# Patient Record
Sex: MALE | Race: WHITE
[De-identification: names, ages, dates, MRNs, and addresses within clinical notes are randomized per-mention and may not be internally consistent; named-entity substitution may affect disease eponyms.]

---

## 2019-06-27 ENCOUNTER — HOSPITAL ENCOUNTER (INPATIENT)
Dept: HOSPITAL 54 - ER | Age: 69
LOS: 2 days | Discharge: ANOTHER HOSPITAL | DRG: 641 | End: 2019-06-29
Attending: NURSE PRACTITIONER | Admitting: INTERNAL MEDICINE
Payer: COMMERCIAL

## 2019-06-27 VITALS — DIASTOLIC BLOOD PRESSURE: 57 MMHG | SYSTOLIC BLOOD PRESSURE: 123 MMHG

## 2019-06-27 VITALS — WEIGHT: 176 LBS | HEIGHT: 73 IN | BODY MASS INDEX: 23.33 KG/M2

## 2019-06-27 VITALS — SYSTOLIC BLOOD PRESSURE: 123 MMHG | DIASTOLIC BLOOD PRESSURE: 57 MMHG

## 2019-06-27 DIAGNOSIS — M47.816: ICD-10-CM

## 2019-06-27 DIAGNOSIS — D69.6: ICD-10-CM

## 2019-06-27 DIAGNOSIS — D64.9: ICD-10-CM
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
## 2019-06-27 DIAGNOSIS — M43.16: ICD-10-CM

## 2019-06-27 DIAGNOSIS — M48.56XA: ICD-10-CM
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
## 2019-06-27 DIAGNOSIS — E83.42: ICD-10-CM

## 2019-06-27 DIAGNOSIS — M85.80: ICD-10-CM

## 2019-06-27 DIAGNOSIS — Z86.718: ICD-10-CM

## 2019-06-27 DIAGNOSIS — F12.90: ICD-10-CM

## 2019-06-27 DIAGNOSIS — D35.02: ICD-10-CM

## 2019-06-27 DIAGNOSIS — E88.09: ICD-10-CM

## 2019-06-27 DIAGNOSIS — E86.0: Primary | ICD-10-CM

## 2019-06-27 LAB
BASOPHILS # BLD AUTO: 0 /CMM (ref 0–0.2)
BASOPHILS NFR BLD AUTO: 0.3 % (ref 0–2)
BUN SERPL-MCNC: 15 MG/DL (ref 7–18)
CALCIUM SERPL-MCNC: 7.6 MG/DL (ref 8.5–10.1)
CHLORIDE SERPL-SCNC: 106 MMOL/L (ref 98–107)
CO2 SERPL-SCNC: 23 MMOL/L (ref 21–32)
CREAT SERPL-MCNC: 1.2 MG/DL (ref 0.6–1.3)
EOSINOPHIL NFR BLD AUTO: 0.2 % (ref 0–6)
GLUCOSE SERPL-MCNC: 133 MG/DL (ref 74–106)
HCT VFR BLD AUTO: 40 % (ref 39–51)
HGB BLD-MCNC: 13.4 G/DL (ref 13.5–17.5)
LYMPHOCYTES NFR BLD AUTO: 0.7 /CMM (ref 0.8–4.8)
LYMPHOCYTES NFR BLD AUTO: 8.3 % (ref 20–44)
MCHC RBC AUTO-ENTMCNC: 34 G/DL (ref 31–36)
MCV RBC AUTO: 98 FL (ref 80–96)
MONOCYTES NFR BLD AUTO: 0.7 /CMM (ref 0.1–1.3)
MONOCYTES NFR BLD AUTO: 7.5 % (ref 2–12)
NEUTROPHILS # BLD AUTO: 7.3 /CMM (ref 1.8–8.9)
NEUTROPHILS NFR BLD AUTO: 83.7 % (ref 43–81)
PLATELET # BLD AUTO: 145 /CMM (ref 150–450)
POTASSIUM SERPL-SCNC: 3.9 MMOL/L (ref 3.5–5.1)
RBC # BLD AUTO: 4.07 MIL/UL (ref 4.5–6)
SODIUM SERPL-SCNC: 138 MMOL/L (ref 136–145)
WBC NRBC COR # BLD AUTO: 8.8 K/UL (ref 4.3–11)

## 2019-06-27 PROCEDURE — G0378 HOSPITAL OBSERVATION PER HR: HCPCS

## 2019-06-27 RX ADMIN — ENOXAPARIN SODIUM SCH MG: 40 INJECTION SUBCUTANEOUS at 23:11

## 2019-06-27 NOTE — NUR
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 
PAIN,STS, HE MAY HAVE LANDED ON HIS BACK, PT IS AAOX4, NOT IN RESPIRATORY 
DISTRESS, HOOKED TO MONITOR, KEPT RESTED AND COMFORTABLE, WILL CONTINUE TO 
MONITOR.

## 2019-06-27 NOTE — NUR
mri screening form.



attempted to perform mri screening form with patient. patient states , "I would rather do 
that tomorrow am when i have a more sound mind. i just want to rest right now." will follow 
up.

## 2019-06-27 NOTE — NUR
admission note rn



patient admitted from er .patient being admitted for syncope telemetry to dr. jackson rn 
312 bed 1. patient in no apparent distress. tele applied patient is in sr. pt also had 
lumbar fracture per xray report. patient reports having back pain when he moves 6/10 but 
states tolerable when he lays still. reviewed pain managment plan with patient. bed dlw  
locked patient in no apparent distress. verbalized understanding to call for assistance and 
verbalized understanding of how to use call light. will cont to monitor.

## 2019-06-28 VITALS — DIASTOLIC BLOOD PRESSURE: 68 MMHG | SYSTOLIC BLOOD PRESSURE: 134 MMHG

## 2019-06-28 VITALS — SYSTOLIC BLOOD PRESSURE: 119 MMHG | DIASTOLIC BLOOD PRESSURE: 60 MMHG

## 2019-06-28 VITALS — SYSTOLIC BLOOD PRESSURE: 120 MMHG | DIASTOLIC BLOOD PRESSURE: 65 MMHG

## 2019-06-28 VITALS — DIASTOLIC BLOOD PRESSURE: 70 MMHG | SYSTOLIC BLOOD PRESSURE: 130 MMHG

## 2019-06-28 VITALS — SYSTOLIC BLOOD PRESSURE: 117 MMHG | DIASTOLIC BLOOD PRESSURE: 73 MMHG

## 2019-06-28 VITALS — SYSTOLIC BLOOD PRESSURE: 125 MMHG | DIASTOLIC BLOOD PRESSURE: 60 MMHG

## 2019-06-28 LAB
ALBUMIN SERPL BCP-MCNC: 2.9 G/DL (ref 3.4–5)
ALP SERPL-CCNC: 61 U/L (ref 46–116)
ALT SERPL W P-5'-P-CCNC: 19 U/L (ref 12–78)
AST SERPL W P-5'-P-CCNC: 25 U/L (ref 15–37)
BASOPHILS # BLD AUTO: 0 /CMM (ref 0–0.2)
BASOPHILS NFR BLD AUTO: 0.1 % (ref 0–2)
BILIRUB SERPL-MCNC: 1 MG/DL (ref 0.2–1)
BUN SERPL-MCNC: 11 MG/DL (ref 7–18)
CALCIUM SERPL-MCNC: 7.8 MG/DL (ref 8.5–10.1)
CHLORIDE SERPL-SCNC: 106 MMOL/L (ref 98–107)
CHOLEST SERPL-MCNC: 119 MG/DL (ref ?–200)
CO2 SERPL-SCNC: 25 MMOL/L (ref 21–32)
CREAT SERPL-MCNC: 1.1 MG/DL (ref 0.6–1.3)
EOSINOPHIL NFR BLD AUTO: 0 % (ref 0–6)
GLUCOSE SERPL-MCNC: 101 MG/DL (ref 74–106)
HCT VFR BLD AUTO: 37 % (ref 39–51)
HDLC SERPL-MCNC: 46 MG/DL (ref 40–60)
HGB BLD-MCNC: 12.6 G/DL (ref 13.5–17.5)
LDLC SERPL DIRECT ASSAY-MCNC: 69 MG/DL (ref 0–99)
LYMPHOCYTES NFR BLD AUTO: 0.6 /CMM (ref 0.8–4.8)
LYMPHOCYTES NFR BLD AUTO: 8.2 % (ref 20–44)
MAGNESIUM SERPL-MCNC: 1.7 MG/DL (ref 1.8–2.4)
MCHC RBC AUTO-ENTMCNC: 34 G/DL (ref 31–36)
MCV RBC AUTO: 96 FL (ref 80–96)
MONOCYTES NFR BLD AUTO: 0.7 /CMM (ref 0.1–1.3)
MONOCYTES NFR BLD AUTO: 9.6 % (ref 2–12)
NEUTROPHILS # BLD AUTO: 6.2 /CMM (ref 1.8–8.9)
NEUTROPHILS NFR BLD AUTO: 82.1 % (ref 43–81)
PHOSPHATE SERPL-MCNC: 2.9 MG/DL (ref 2.5–4.9)
PLATELET # BLD AUTO: 133 /CMM (ref 150–450)
POTASSIUM SERPL-SCNC: 4.3 MMOL/L (ref 3.5–5.1)
PROT SERPL-MCNC: 5.7 G/DL (ref 6.4–8.2)
RBC # BLD AUTO: 3.87 MIL/UL (ref 4.5–6)
SODIUM SERPL-SCNC: 140 MMOL/L (ref 136–145)
TRIGL SERPL-MCNC: 37 MG/DL (ref 30–150)
WBC NRBC COR # BLD AUTO: 7.5 K/UL (ref 4.3–11)

## 2019-06-28 RX ADMIN — DEXTROSE MONOHYDRATE PRN MG: 50 INJECTION, SOLUTION INTRAVENOUS at 16:19

## 2019-06-28 RX ADMIN — ENOXAPARIN SODIUM SCH MG: 40 INJECTION SUBCUTANEOUS at 10:45

## 2019-06-28 RX ADMIN — Medication PRN EACH: at 05:17

## 2019-06-28 RX ADMIN — Medication PRN EACH: at 17:40

## 2019-06-28 RX ADMIN — HEPARIN SODIUM PRN MLS/HR: 5000 INJECTION, SOLUTION INTRAVENOUS at 18:18

## 2019-06-28 RX ADMIN — Medication PRN MG: at 16:18

## 2019-06-28 RX ADMIN — MAGNESIUM SULFATE IN DEXTROSE SCH MLS/HR: 10 INJECTION, SOLUTION INTRAVENOUS at 08:54

## 2019-06-28 RX ADMIN — MAGNESIUM SULFATE IN DEXTROSE SCH MLS/HR: 10 INJECTION, SOLUTION INTRAVENOUS at 07:55

## 2019-06-28 NOTE — NUR
lumbar brace



lumbar brace is not available at this time per charge nurse paty whom states he spoke with 
house supervisor. will endorse to day shift.

## 2019-06-28 NOTE — NUR
RN NOTES:

RECEIVED ENDORSEMENT FROM  , PATIENT IS LYING COMFORTABLY IN BED, A/O4, COOPERATIVE AND 
VERY PLEASANT PERSONALITY, CONTINENT B/B, USING THE URINAL, ABLE TO MAKE NEEDS KNOWN. IV 
CANNULA INTACT IN LW GAUGE#18, HEPARIN INFUSION ONGOING 1450 UNIT/KG AT 29 ML/HR VIA 
INFUSION PUMP, DUE FOR PT/INR AT 0025,ON CLOSE WATCH AND OBSERVE FOR SIGN OF BLEEDING,PER 
ENDORSEMENT HE WAS SUPPOSE TO HAVE CT ANGIO TODAY AND HE REFUSED,BUT HE AGREED TO SIGN THE 
CONSENT AND DO IT FOR TOMORROW 6/29/19,HE RECEIVED HIS NORCO NO PAIN OR DISCOMFORT UPON 
CHANGE OF SHIFT, PER ENDORSEMENT NEED TO F/U WITH DR. URIAS  FOR ANY FURTHER INSTRUCTION 
FOR CT ANGIO TOMORROW. HE IS ALSO FOR MRI LOWER SPINE W/O CONTRAST.AWAITING TO BE SEEN BY 
NEUROLOGIST TODAY, SO THAT THEY WILL BE ABLE TO DETERMINE IF PATIENT NEEDS TO USE LUMBAR 
BRACE, AFTER NEURO CONSULT, PATIENT SHOULD BE REFERRED TO PT TO DETERMINE THE MEASUREMENT OF 
THE BRACE AND ONCE EVERYTHING WILL BE FINALIZE COMPANY AND NUMBER WAS PROVIDED TO ORDER THE 
LUMBAR BRACE. KEPT ON CLOSE WATCH, FALL,SAFETY,CAREFUL HANDLING  AND ASPIRATION PRECAUTION 
OBSERVED AT ALL TIMES.

-------------------------------------------------------------------------------

Addendum: 06/28/19 at 2319 by SHANNEN LANGLEY RN

-------------------------------------------------------------------------------

ADDITIONAL NOTES:

ON TELE MONITOR SR-74.

## 2019-06-28 NOTE — NUR
TELE RN OPENING NOTES



Patient received on room air, no sob noted.  Patient denies pain at this time.  Patient 
remains a/o x4.  Resting comfortably.  Bed at the lowest setting, call light within reach.

## 2019-06-28 NOTE — NUR
RN NOTES:

ENDORSED TO ALTAGRACIA FOR CONTINUITY OF CARE, NO BLEEDING NOTED,KEPT ON CLOSE  VISUAL CHECK,BED 
LOW AND LOCKED, CALL LIGHT WITHIN EASY REACH, SIDE RAILS UP X 2.KEPT COMFORTABLE IN BED.

## 2019-06-28 NOTE — NUR
TELE RN CLOSING NOTES



Patient remains on room air, no sob noted.  Patient states that his pain is controlled with 
norco, and morphine.  Patient is currently on a heparin drip that started at 1825, PTT to be 
redrawn at 0025.  PTT ordered for 0025.  Patient is on 1450 units/kg that equates to 29 mL 
per hour.  Calculations done with charge nurse, co signed the medications with the charge 
nurse as well.  Left wrist 18 gauge remains patent, unobstructed.  Bed at the lowest 
setting, call light within reach.

## 2019-06-29 VITALS — DIASTOLIC BLOOD PRESSURE: 74 MMHG | SYSTOLIC BLOOD PRESSURE: 147 MMHG

## 2019-06-29 VITALS — SYSTOLIC BLOOD PRESSURE: 146 MMHG | DIASTOLIC BLOOD PRESSURE: 72 MMHG

## 2019-06-29 VITALS — DIASTOLIC BLOOD PRESSURE: 79 MMHG | SYSTOLIC BLOOD PRESSURE: 146 MMHG

## 2019-06-29 VITALS — SYSTOLIC BLOOD PRESSURE: 126 MMHG | DIASTOLIC BLOOD PRESSURE: 76 MMHG

## 2019-06-29 LAB
BASOPHILS # BLD AUTO: 0 /CMM (ref 0–0.2)
BASOPHILS NFR BLD AUTO: 0.1 % (ref 0–2)
BUN SERPL-MCNC: 8 MG/DL (ref 7–18)
CALCIUM SERPL-MCNC: 7.7 MG/DL (ref 8.5–10.1)
CHLORIDE SERPL-SCNC: 100 MMOL/L (ref 98–107)
CO2 SERPL-SCNC: 23 MMOL/L (ref 21–32)
CREAT SERPL-MCNC: 0.9 MG/DL (ref 0.6–1.3)
EOSINOPHIL NFR BLD AUTO: 0.1 % (ref 0–6)
GLUCOSE SERPL-MCNC: 122 MG/DL (ref 74–106)
HCT VFR BLD AUTO: 41 % (ref 39–51)
HGB BLD-MCNC: 13.9 G/DL (ref 13.5–17.5)
LYMPHOCYTES NFR BLD AUTO: 0.4 /CMM (ref 0.8–4.8)
LYMPHOCYTES NFR BLD AUTO: 5.4 % (ref 20–44)
MAGNESIUM SERPL-MCNC: 1.9 MG/DL (ref 1.8–2.4)
MCHC RBC AUTO-ENTMCNC: 34 G/DL (ref 31–36)
MCV RBC AUTO: 95 FL (ref 80–96)
MONOCYTES NFR BLD AUTO: 0.6 /CMM (ref 0.1–1.3)
MONOCYTES NFR BLD AUTO: 7.4 % (ref 2–12)
NEUTROPHILS # BLD AUTO: 6.9 /CMM (ref 1.8–8.9)
NEUTROPHILS NFR BLD AUTO: 87 % (ref 43–81)
PHOSPHATE SERPL-MCNC: 2.4 MG/DL (ref 2.5–4.9)
PLATELET # BLD AUTO: 121 /CMM (ref 150–450)
POTASSIUM SERPL-SCNC: 3.7 MMOL/L (ref 3.5–5.1)
RBC # BLD AUTO: 4.26 MIL/UL (ref 4.5–6)
SODIUM SERPL-SCNC: 136 MMOL/L (ref 136–145)
WBC NRBC COR # BLD AUTO: 7.9 K/UL (ref 4.3–11)

## 2019-06-29 RX ADMIN — Medication PRN EACH: at 08:28

## 2019-06-29 RX ADMIN — DEXTROSE MONOHYDRATE PRN MG: 50 INJECTION, SOLUTION INTRAVENOUS at 08:20

## 2019-06-29 RX ADMIN — Medication PRN MG: at 13:25

## 2019-06-29 RX ADMIN — HEPARIN SODIUM PRN MLS/HR: 5000 INJECTION, SOLUTION INTRAVENOUS at 13:22

## 2019-06-29 RX ADMIN — DEXTROSE MONOHYDRATE PRN MG: 50 INJECTION, SOLUTION INTRAVENOUS at 15:27

## 2019-06-29 NOTE — NUR
RN NOTES

 PATIENT  DISCHARGE AT THIS  TIME AND TRANSFERRED  TO [facility]UTE [facility] FOR FOLLOW UP. PATIENT 
STABLE NO ACUTE RESPIRATORY DISTRESS. MEDICATION WERE ADMINISTERED FOR PAIN AND NAUSEA 
AFFECTIVE., V/S STABLE MED RECONCILIATION AND DISCHARGE ORDER REVIEWED  AND EXPLAINED TO 
PATIENT  AND RN [facility]. RN VERBALIZED UNDERSTANDING. PATIENT TRANSFERRED WITH 
HEPARIN DRIP.  BELONGING   WITH THE PATIENT, PATIENT SIGN PAPERWORK. PATIENT REFUSED  TO 
CALL AND  NOTIFY FAMILY HE WILL TAKE CARE OF IT.  ALSO REPORT GIVEN EMT  PERSONAL RN. 
PATIENT  BY AMBULANCE.

## 2019-06-29 NOTE — NUR
RN NOTES

 PATIENT BACK FROM  CT ANGIOGRAM. MEDICATION WERE ADMINISTERED FOR PAIN AND NAUSEA 
EFFECTIVE. PATIENT USING URINAL. CONTINUED MONITORING.

## 2019-06-29 NOTE — NUR
rn notes

 administered  narco 5/325 mg po prn for lower back pain, 8/10, and  Zofran 4 mg/ml iv push 
for nausea, v/s taken bp 126//76, p-76, continued monitoring.

## 2019-06-29 NOTE — NUR
RN NOTES

 ADMINISTERED  MORPHINE SULFATE 2 MG/ML IV PUSH FOR LOWER BACK PAIN 8/10 PER PATIENT 
REQUEST, V/S TAKEN BP -[id number], P-78. ALSO PATIENT GOING TO TRANSFER TO THE [facility] FOR SURGERY.

## 2019-06-29 NOTE — NUR
RN NOTES

 RECEIVED PATIENT IN THE BED  SLEEPING. NO ACUTE RESPIRATORY DISTRESS, PATIENT AROUSE WHEN 
TOUCHED OR CALLED NAME. V/S STABLE.  INFUSING HEPARIN RATE 1450 AT THIS TIME.  WAS 
COMPLAINING OF PAIN ON LOWER BACK, ENCOURAGED TO EXPRESS FEELINGS AND CONCERNS,  NEEDS 
ATTENDED  AND ANTICIPATED. CALL LIGHT WITHIN TO REACH. CONTINUED MONITORING.

## 2019-06-29 NOTE — NUR
rn notes

 per  cardiologist Dr. Quinones transfer patient to [facility]. Patient sign consent form for CT 
of pulmonary angiogram.

## 2021-12-01 NOTE — NUR
TELE RN CLOSING NOTES



PT REMAINS IN BED, SLEEPING INTERMITANTLY. PT ON RA WITH NO RESPIRATORY DISTRESS NOTED 
DENIES SOB. ON TELEMONITORING WITH SR ON MONITOR.PATIENT LYING ON BACK WITH BED FLAT. 
REPORTS HE IS SEMI COMFORTABLE. REVIEWED POC PATIENT TO BE FITTED WITH LUMBAR BRACE TODAY 
WILL ENDORSE TO NEXT SHIFT.IV HEPLOCKED TO LEFT WRIST FLUSHES NO S/S OF COMPLICATIONS. PT 
USING URINAL WITH ARIANNA URNINE PRODUCTION. PT'S BED KEPT IN LOWEST, LOCKED POSITION WITH SR 
X3. CALL LIGHT IN REACH. Isotretinoin Pregnancy And Lactation Text: This medication is Pregnancy Category X and is considered extremely dangerous during pregnancy. It is unknown if it is excreted in breast milk.

## 2022-10-19 ENCOUNTER — OFFICE (OUTPATIENT)
Dept: URBAN - METROPOLITAN AREA CLINIC 66 | Facility: CLINIC | Age: 72
End: 2022-10-19

## 2022-10-19 VITALS — HEIGHT: 73 IN | DIASTOLIC BLOOD PRESSURE: 76 MMHG | WEIGHT: 160 LBS | SYSTOLIC BLOOD PRESSURE: 137 MMHG

## 2022-10-19 DIAGNOSIS — R93.5: ICD-10-CM

## 2022-10-19 DIAGNOSIS — R63.4 ABNORMAL WEIGHT LOSS: ICD-10-CM

## 2022-10-19 DIAGNOSIS — B37.81 CANDIDIASIS OF THE ESOPHAGUS: ICD-10-CM

## 2022-10-19 PROCEDURE — 99205 OFFICE O/P NEW HI 60 MIN: CPT | Performed by: INTERNAL MEDICINE

## 2022-10-19 NOTE — SERVICEHPINOTES
This is a   72   year old  male   seen   in consultation at the request of Dr. LOR OBRIEN  .Last colonoscopy showed diverticular disease 2013 and had an EGD by m partner  2014 showing Candida.

## 2022-11-03 ENCOUNTER — AMBULATORY SURGICAL CENTER (OUTPATIENT)
Dept: URBAN - METROPOLITAN AREA SURGERY 42 | Facility: SURGERY | Age: 72
End: 2022-11-03

## 2022-11-03 DIAGNOSIS — K57.30 DIVERTICULOSIS OF LARGE INTESTINE WITHOUT PERFORATION OR ABS: ICD-10-CM

## 2022-11-03 DIAGNOSIS — K22.89 OTHER SPECIFIED DISEASE OF ESOPHAGUS: ICD-10-CM

## 2022-11-03 DIAGNOSIS — K22.2 ESOPHAGEAL OBSTRUCTION: ICD-10-CM

## 2022-11-03 DIAGNOSIS — K31.89 OTHER DISEASES OF STOMACH AND DUODENUM: ICD-10-CM

## 2022-11-03 DIAGNOSIS — K44.9 DIAPHRAGMATIC HERNIA WITHOUT OBSTRUCTION OR GANGRENE: ICD-10-CM

## 2022-11-03 PROBLEM — K63.5 POLYP OF COLON: Status: ACTIVE | Noted: 2022-11-03

## 2022-11-03 PROCEDURE — 45385 COLONOSCOPY W/LESION REMOVAL: CPT | Performed by: INTERNAL MEDICINE

## 2022-11-03 PROCEDURE — 43239 EGD BIOPSY SINGLE/MULTIPLE: CPT | Performed by: INTERNAL MEDICINE

## 2022-11-03 PROCEDURE — 45380 COLONOSCOPY AND BIOPSY: CPT | Mod: 59 | Performed by: INTERNAL MEDICINE

## 2023-04-05 ENCOUNTER — OFFICE (OUTPATIENT)
Dept: URBAN - METROPOLITAN AREA CLINIC 66 | Facility: CLINIC | Age: 73
End: 2023-04-05

## 2023-04-05 VITALS
TEMPERATURE: 97.7 F | DIASTOLIC BLOOD PRESSURE: 71 MMHG | HEART RATE: 73 BPM | RESPIRATION RATE: 14 BRPM | OXYGEN SATURATION: 99 % | HEIGHT: 73 IN | SYSTOLIC BLOOD PRESSURE: 156 MMHG

## 2023-04-05 VITALS — DIASTOLIC BLOOD PRESSURE: 80 MMHG | SYSTOLIC BLOOD PRESSURE: 140 MMHG | WEIGHT: 158 LBS | HEIGHT: 73 IN

## 2023-04-05 DIAGNOSIS — R14.0 ABDOMINAL BLOATING: ICD-10-CM

## 2023-04-05 DIAGNOSIS — R93.5: ICD-10-CM

## 2023-04-05 DIAGNOSIS — R63.4 ABNORMAL WEIGHT LOSS: ICD-10-CM

## 2023-04-05 DIAGNOSIS — R15.9 INCONTINENCE, FECES: ICD-10-CM

## 2023-04-05 PROCEDURE — 99214 OFFICE O/P EST MOD 30 MIN: CPT | Performed by: INTERNAL MEDICINE

## 2023-04-05 NOTE — SERVICEHPINOTES
GURPREET KNUTSON   returns today for follow-up from last visit on   11/3/2022  .   c/o gas/bloat. weight stable and energy back

## 2024-11-22 ENCOUNTER — OFFICE (OUTPATIENT)
Dept: URBAN - METROPOLITAN AREA CLINIC 67 | Facility: CLINIC | Age: 74
End: 2024-11-22

## 2024-11-22 VITALS — SYSTOLIC BLOOD PRESSURE: 126 MMHG | WEIGHT: 166 LBS | HEIGHT: 73 IN | DIASTOLIC BLOOD PRESSURE: 72 MMHG

## 2024-11-22 DIAGNOSIS — R19.4 CHANGE IN BOWEL HABITS: ICD-10-CM

## 2024-11-22 DIAGNOSIS — R10.30 ABDOMINAL PAIN, OTHER OR MULTIPLE SITES: ICD-10-CM

## 2024-11-22 PROCEDURE — 99214 OFFICE O/P EST MOD 30 MIN: CPT | Performed by: NURSE PRACTITIONER

## 2024-11-22 NOTE — SERVICEHPINOTES
This is a very pleasant   74   year old  male   seen   in consultation at the request of Dr. LOR OBRIEN  . Patient was referred to Dr. Arguelles and presents for evaluation of a change in bowel habits that he experienced for a couple of weeks and has now resolved. 
laurie sullivan  He was noting an increased frequency of stools up from about 2-3 bowel movements per day to 4 or 5 smaller bowel movements per day. There was no associated rectal bleeding or blood in his stool. However he would find that he would have to wipe and clean a bit more. Denies any hemorrhoidal itching or pain. He has not had any change in diet. There were no associated fevers or chills, nausea or vomiting, or weight loss. His appetite has been good. It seems that his bowel movements have returned to normal.  Back in April 2023 he weighed 158 pounds and today he weighs 166 pounds.
laurie Dukenataliya for about the past year he has been noting a lump in his mid abdomen that can sometimes be tender when he pushes on it. From his description it sounds like a possible umbilical hernia. No current pain. Has forgotten to mention it to his PCP. 
laurie Sanabria had an endoscopy and colonoscopy in November 2022 due to weight loss he was experiencing at the time and abnormal findings on a CT scan (soft tissue density within the sigmoid measuring 3 x 2.3 cm possible fecal matter but mass could not be excluded on October 11, 2022). Rectosigmoid biopsies were taken with no significant pathological changes seen. He had multiple polyps removed from his colon and a 3 year surveillance was advised.   He had duodenitis, hiatal hernia, esophagitis, and an esophageal ring on his EGD. On pathology there was no evidence of celiac disease. There was mild chronic gastritis with no H. pylori or intestinal metaplasia. 
The patient has no family history of colon cancer or other significant GI diseases. Patient denies fever, nausea, vomiting, dysphagia, constipation, diarrhea, rectal bleeding, melena, and significant change in weight.
laurie Sanabria reports rarely having acid reflux. He usually takes a Pepcid when he does experience it. Has not had any dysphagia. 
laurie sullivan  Otherwise has been doing well. Follows regularly with his PCP. He has had 3 blood clots in the past and is on Xarelto. Denies having any other cardiac or respiratory problems or having any known kidney dysfunction. Denies any chest pain or shortness of breath at rest or with activity.

## 2025-01-31 ENCOUNTER — AMBULATORY SURGICAL CENTER (OUTPATIENT)
Dept: URBAN - METROPOLITAN AREA SURGERY 42 | Facility: SURGERY | Age: 75
End: 2025-01-31

## 2025-01-31 VITALS
OXYGEN SATURATION: 98 % | RESPIRATION RATE: 16 BRPM | SYSTOLIC BLOOD PRESSURE: 149 MMHG | DIASTOLIC BLOOD PRESSURE: 69 MMHG | WEIGHT: 170 LBS | HEART RATE: 68 BPM | TEMPERATURE: 97.4 F | HEIGHT: 73 IN

## 2025-01-31 DIAGNOSIS — Z86.0100 PERSONAL HISTORY OF COLON POLYPS, UNSPECIFIED: ICD-10-CM

## 2025-01-31 PROCEDURE — 45378 DIAGNOSTIC COLONOSCOPY: CPT | Performed by: INTERNAL MEDICINE

## 2025-01-31 NOTE — SERVICEHPINOTES
This is a very pleasant 74 year old male seen in consultation at the request of Dr. LOR OBRIEN. Patient was referred to Dr. Arguelles and presents for evaluation of a change in bowel habits that he experienced for a couple of weeks and has now resolved.He was noting an increased frequency of stools up from about 2-3 bowel movements per day to 4 or 5 smaller bowel movements per day. There was no associated rectal bleeding or blood in his stool. However he would find that he would have to wipe and clean a bit more. Denies any hemorrhoidal itching or pain. He has not had any change in diet. There were no associated fevers or chills, nausea or vomiting, or weight loss. His appetite has been good. It seems that his bowel movements have returned to normal. Back in April 2023 he weighed 158 pounds and today he weighs 166 pounds.Also for about the past year he has been noting a lump in his mid abdomen that can sometimes be tender when he pushes on it. From his description it sounds like a possible umbilical hernia. No current pain. Has forgotten to mention it to his PCP.He had an endoscopy and colonoscopy in November 2022 due to weight loss he was experiencing at the time and abnormal findings on a CT scan (soft tissue density within the sigmoid measuring 3 x 2.3 cm possible fecal matter but mass could not be excluded on October 11, 2022). Rectosigmoid biopsies were taken with no significant pathological changes seen. He had multiple polyps removed from his colon and a 3 year surveillance was advised.He had duodenitis, hiatal hernia, esophagitis, and an esophageal ring on his EGD. On pathology there was no evidence of celiac disease. There was mild chronic gastritis with no H. pylori or intestinal metaplasia.The patient has no family history of colon cancer or other significant GI diseases. Patient denies fever, nausea, vomiting, dysphagia, constipation, diarrhea, rectal bleeding, melena, and significant change in weight.He reports rarely having acid reflux. He usually takes a Pepcid when he does experience it. Has not had any dysphagia.Otherwise has been doing well. Follows regularly with his PCP. He has had 3 blood clots in the past and is on Xarelto. Denies having any other cardiac or respiratory problems or having any known kidney dysfunction. Denies any chest pain or shortness of breath at rest or with activity.